# Patient Record
Sex: FEMALE | Race: WHITE | NOT HISPANIC OR LATINO | Employment: OTHER | URBAN - METROPOLITAN AREA
[De-identification: names, ages, dates, MRNs, and addresses within clinical notes are randomized per-mention and may not be internally consistent; named-entity substitution may affect disease eponyms.]

---

## 2020-12-01 ENCOUNTER — TELEMEDICINE (OUTPATIENT)
Dept: GERIATRICS | Facility: OTHER | Age: 85
End: 2020-12-01
Payer: MEDICARE

## 2020-12-01 DIAGNOSIS — F41.9 ANXIETY: ICD-10-CM

## 2020-12-01 DIAGNOSIS — F33.2 SEVERE EPISODE OF RECURRENT MAJOR DEPRESSIVE DISORDER, WITHOUT PSYCHOTIC FEATURES (HCC): Primary | ICD-10-CM

## 2020-12-01 PROCEDURE — 99326 PR DOMICIL/REST HOME NEW PT HI-MOD SEVER 45 MINUTES: CPT | Performed by: NURSE PRACTITIONER

## 2020-12-01 RX ORDER — ESCITALOPRAM OXALATE 10 MG/1
15 TABLET ORAL DAILY
Qty: 45 TABLET | Refills: 1 | Status: SHIPPED | OUTPATIENT
Start: 2020-12-01 | End: 2020-12-01

## 2020-12-01 RX ORDER — ESCITALOPRAM OXALATE 10 MG/1
15 TABLET ORAL DAILY
Qty: 45 TABLET | Refills: 1 | Status: SHIPPED | OUTPATIENT
Start: 2020-12-01 | End: 2021-02-05

## 2020-12-01 RX ORDER — LORAZEPAM 0.5 MG/1
0.5 TABLET ORAL 2 TIMES DAILY
Qty: 60 TABLET | Refills: 1 | Status: SHIPPED | OUTPATIENT
Start: 2020-12-01 | End: 2020-12-01

## 2020-12-01 RX ORDER — LORAZEPAM 0.5 MG/1
0.5 TABLET ORAL 2 TIMES DAILY
Qty: 60 TABLET | Refills: 1 | Status: SHIPPED | OUTPATIENT
Start: 2020-12-01 | End: 2021-01-26 | Stop reason: SDUPTHER

## 2020-12-02 PROBLEM — I26.99 PULMONARY EMBOLISM WITH INFARCTION (HCC): Status: ACTIVE | Noted: 2018-02-03

## 2020-12-02 PROBLEM — H61.23 BILATERAL IMPACTED CERUMEN: Status: ACTIVE | Noted: 2019-03-05

## 2020-12-02 PROBLEM — E03.9 ACQUIRED HYPOTHYROIDISM: Status: ACTIVE | Noted: 2017-06-21

## 2020-12-02 PROBLEM — E78.49 OTHER HYPERLIPIDEMIA: Status: ACTIVE | Noted: 2018-02-03

## 2020-12-02 RX ORDER — WARFARIN SODIUM 3 MG/1
3 TABLET ORAL DAILY
COMMUNITY

## 2020-12-02 RX ORDER — LEVOTHYROXINE SODIUM 0.15 MG/1
150 TABLET ORAL DAILY
COMMUNITY

## 2020-12-02 RX ORDER — MEMANTINE HYDROCHLORIDE 10 MG/1
TABLET ORAL
COMMUNITY
Start: 2020-09-09

## 2020-12-02 RX ORDER — ACETAMINOPHEN 500 MG
500 TABLET ORAL 3 TIMES DAILY
COMMUNITY

## 2020-12-02 RX ORDER — LOVASTATIN 20 MG/1
20 TABLET ORAL
COMMUNITY

## 2020-12-02 RX ORDER — DONEPEZIL HYDROCHLORIDE 10 MG/1
TABLET, FILM COATED ORAL
COMMUNITY
Start: 2020-06-18

## 2020-12-02 RX ORDER — PHENOL 1.4 %
1 AEROSOL, SPRAY (ML) MUCOUS MEMBRANE 2 TIMES DAILY
COMMUNITY

## 2021-01-26 DIAGNOSIS — F41.9 ANXIETY: ICD-10-CM

## 2021-01-26 RX ORDER — LORAZEPAM 0.5 MG/1
0.5 TABLET ORAL 2 TIMES DAILY
Qty: 60 TABLET | Refills: 1 | Status: SHIPPED | OUTPATIENT
Start: 2021-01-26 | End: 2021-01-28

## 2021-01-28 RX ORDER — LORAZEPAM 0.5 MG/1
0.5 TABLET ORAL EVERY 8 HOURS PRN
Qty: 30 TABLET | Refills: 3 | Status: SHIPPED | OUTPATIENT
Start: 2021-01-28

## 2021-02-04 ENCOUNTER — NURSING HOME VISIT (OUTPATIENT)
Dept: GERIATRICS | Facility: OTHER | Age: 86
End: 2021-02-04
Payer: MEDICARE

## 2021-02-04 DIAGNOSIS — F01.51 MIXED ALZHEIMER'S AND VASCULAR DEMENTIA WITH BEHAVIOR DISTURBANCES (HCC): ICD-10-CM

## 2021-02-04 DIAGNOSIS — G30.9 MIXED ALZHEIMER'S AND VASCULAR DEMENTIA WITH BEHAVIOR DISTURBANCES (HCC): ICD-10-CM

## 2021-02-04 DIAGNOSIS — F33.3 SEVERE EPISODE OF RECURRENT MAJOR DEPRESSIVE DISORDER, WITH PSYCHOTIC FEATURES (HCC): Primary | ICD-10-CM

## 2021-02-04 DIAGNOSIS — F33.2 SEVERE EPISODE OF RECURRENT MAJOR DEPRESSIVE DISORDER, WITHOUT PSYCHOTIC FEATURES (HCC): ICD-10-CM

## 2021-02-04 DIAGNOSIS — F41.1 GENERALIZED ANXIETY DISORDER: ICD-10-CM

## 2021-02-04 PROCEDURE — 99335 PR DOM/R-HOME E/M EST PT LW MOD SEVERITY 25 MINUTES: CPT | Performed by: NURSE PRACTITIONER

## 2021-02-05 RX ORDER — ESCITALOPRAM OXALATE 20 MG/1
20 TABLET ORAL DAILY
Start: 2021-02-05

## 2021-02-05 NOTE — PROGRESS NOTES
[facility] OUTPATIENT CLINIC  [address]  POS: 13: [address] [facility]    MEDICATION MANAGEMENT NOTE    NAME: Sherrie Gale  AGE: 80 y o  SEX: female [id number]    DATE OF ENCOUNTER: 2/4/2021    Assessment and Plan      Diagnosis ICD-10-CM Associated Orders   1  Severe episode of recurrent major depressive disorder, with psychotic features ([facility] [place] [address] )  F33 3    2  Severe episode of recurrent major depressive disorder, without psychotic features ([facility] [place] [address] )  F33 2 escitalopram (LEXAPRO) 20 mg tablet   3  Generalized anxiety disorder  F41 1    4  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)  G30 9     F01 51        Recommendations to change antidepressant or augmenting strategies addressed with family  Treatment Recommendations/Precautions:      Medication management every 1 month    Medications Risks/Benefits      Risks, Benefits And Possible Side Effects Of Medications:    Risks, benefits, and possible side effects of medications explained to family, POA and Ming Parikh understanding and agreement for treatment  Controlled Medication Discussion:     Luci Lamb has been filling controlled prescriptions on time as prescribed according to [facility]    Evaluation of Psychotropic Drugs for possible gradual dose reductions    Psychotropic medications have been reviewed  Patient continues with symptoms of depression, anxiety as noted below  Any dose reductions at this time would be clinically contraindicated, as it would be likely to cause worsening of symptoms  Psychotherapy Provided:     Individual psychotherapy provided: Medications, treatment progress and treatment plan reviewed with Luci Lamb  Reassurance and supportive therapy provided  Reoriented to reality and reassured        Chief Complaint     Follow up for worsening depression/anxiety and new onset agitation    History of Present Illness     Patient is seen in follow up for increased depression/Anxiety  Since last visit has had increased anxiety, restlessness, poor sleep, poor appetite, worsening agitation and aggression  At last visit, had recommended addition of Remeron, but family resistant to any additional medications  Have had two conversations with her son and medical Sebastian Crumbly  He did expressed concern with addition of ativan, for risk of falls and increased confusion  Ativan was changed to PRN only last week, I did explain that the ativan does seem to provide her some benefit when she is extremely agitated and unable to console with other interventions, reviewed risk/benefits and he is agreeable to continuing Ativan as needed, until anxiety and agitation improve  Discussed alternatively using low dose antipsychotic for agitation, he is against the use of antipsychotics for his mother  We discussed option of changing antidepressant to Trintillex, or augmenting with Remeron, or Neurontin, or Lamitcal   He wishes to discuss with his sister first before agreeing to any further medication changes  I met with Dontrell Couch, she was initially lying down in another patients room, in another patients bed  She was easily redirected to her room  She presents apprehensive/suspicious  She is afraid no one here likes her, she states she does not like it here and wants to go home  She is perseverative and difficult to reassure , distract or console  She does admit to depressed mood and passive death wish, but denies any suicidal thoughts  She is restless, moves from sitting to lying down several times  She did have to receive PRN ativan earlier today  She has limited insight into her present situation, oriented to self only  Anxiety  Presents for follow-up visit  Symptoms include confusion, decreased concentration, depressed mood, excessive worry, insomnia, irritability and nervous/anxious behavior  Symptoms occur constantly  The severity of symptoms is severe  The quality of sleep is poor  Nighttime awakenings: several      Compliance with medications is %  Depression  This is a chronic problem  The current episode started more than 1 year ago  The problem occurs constantly  The problem has been gradually worsening  The treatment provided mild relief  She reports difficulty sleeping, decreased appetite, fluctuating energy levels    The following portions of the patient's history were reviewed and updated as appropriate: allergies, current medications, past family history, past medical history, past social history, past surgical history and problem list     Review of Systems     Review of Systems   Constitutional: Positive for activity change, appetite change and irritability  Psychiatric/Behavioral: Positive for agitation, behavioral problems, confusion, decreased concentration, depression, dysphoric mood and sleep disturbance  The patient is nervous/anxious and has insomnia  All other systems reviewed and are negative  Active Problem List     Patient Active Problem List   Diagnosis    Acquired hypothyroidism    Bilateral impacted cerumen    Chronic pain of left knee    Depression, major, recurrent, in partial remission ([facility] [address] )    Generalized anxiety disorder    Glaucoma    Hx-TIA (transient ischemic attack)    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 ([facility] [address] )    Other hyperlipidemia    Pulmonary embolism with infarction ([facility] [address] )    Urge urinary incontinence       Objective       Physical Exam  Vitals signs and nursing note reviewed  Psychiatric:         Attention and Perception: She is inattentive  Mood and Affect: Mood is anxious and depressed  Affect is flat and tearful  Speech: Speech is delayed  Behavior: Behavior is agitated and aggressive  Thought Content: Thought content is paranoid           Cognition and Memory: Cognition is impaired  Judgment: Judgment is impulsive and inappropriate  Pertinent Laboratory/Diagnostic Studies:  I have personally reviewed pertinent lab results        Mental Status Evaluation:    Appearance age appropriate, casually dressed   Behavior appears anxious   Speech increased latency of response, soft   Mood depressed, dysphoric, irritable   Affect constricted   Thought Processes circumstantial   Associations perseverative   Thought Content no overt delusions, mild paranoia, obsessive thoughts, negative thoughts, ruminating thoughts   Perceptual Disturbances: no auditory hallucinations, no visual hallucinations   Abnormal Thoughts  Risk Potential Suicidal ideation - None  Homicidal ideation - None  Potential for aggression - Yes, due to agitation   Orientation oriented to person   Memory recent memory impaired   Consciousness alert and awake   Attention Span Concentration Span attention span and concentration appear shorter than expected for age   Intellect appears to be of average intelligence   Insight poor   Judgement poor   Muscle Strength and  Gait uses walker   Motor activity no abnormal movements   Language difficulty naming common objects, difficulty repeating a phrase, difficulty writing a sentence   Fund of Knowledge impaired knowledge of current events  impaired fund of knowledge regarding past history  impaired fund of knowledge regarding vocabulary       Current Medications       Current Outpatient Medications:     acetaminophen (TYLENOL) 500 mg tablet, Take 500 mg by mouth Three times a day, Disp: , Rfl:     calcium carbonate (OS-BRISEYDA) 600 MG tablet, Take 1 tablet by mouth 2 (two) times a day, Disp: , Rfl:     donepezil (ARICEPT) 10 mg tablet, TAKE ONE TABLET DAILY, Disp: , Rfl:     escitalopram (LEXAPRO) 20 mg tablet, Take 1 tablet (20 mg total) by mouth daily, Disp: , Rfl:     folic acid-pyridoxine-cyanocobalamin 2 5-25-2 mg, Take 1 tablet by mouth daily, Disp: , Rfl:     levothyroxine 150 mcg tablet, Take 150 mcg by mouth daily, Disp: , Rfl:     LORazepam (ATIVAN) 0 5 mg tablet, Take 1 tablet (0 5 mg total) by mouth every 8 (eight) hours as needed for anxiety, Disp: 30 tablet, Rfl: 3    lovastatin (MEVACOR) 20 mg tablet, Take 20 mg by mouth, Disp: , Rfl:     memantine (NAMENDA) 10 mg tablet, TAKE ONE TABLET 2 TIMES A DAY, Disp: , Rfl:     warfarin (COUMADIN) 3 mg tablet, Take 3 mg by mouth daily, Disp: , Rfl:       Counseling / Coordination of Care  Total floor / unit time spent today 25 minutes  Greater than 50% of total time was spent with the patient and / or family counseling and / or coordination of care       ALVIN Forde 02/05/21

## 2022-01-12 DIAGNOSIS — E03.9 ACQUIRED HYPOTHYROIDISM: Primary | ICD-10-CM

## 2022-01-12 RX ORDER — LEVOTHYROXINE SODIUM 0.15 MG/1
150 TABLET ORAL
Qty: 30 TABLET | Refills: 5 | Status: SHIPPED | OUTPATIENT
Start: 2022-01-12

## 2022-01-19 DIAGNOSIS — F39 MOOD DISORDER (HCC): Primary | ICD-10-CM

## 2022-01-19 DIAGNOSIS — F39 MOOD DISORDER (HCC): ICD-10-CM

## 2022-01-19 RX ORDER — LORAZEPAM 2 MG/ML
0.5 CONCENTRATE ORAL EVERY 12 HOURS PRN
Qty: 30 ML | Refills: 1 | Status: SHIPPED | OUTPATIENT
Start: 2022-01-19 | End: 2022-01-19 | Stop reason: SDUPTHER

## 2022-01-19 RX ORDER — LORAZEPAM 2 MG/ML
0.5 CONCENTRATE ORAL EVERY 12 HOURS PRN
Qty: 30 ML | Refills: 1 | Status: SHIPPED | OUTPATIENT
Start: 2022-01-19